# Patient Record
Sex: MALE | Race: WHITE | NOT HISPANIC OR LATINO | Employment: FULL TIME | URBAN - METROPOLITAN AREA
[De-identification: names, ages, dates, MRNs, and addresses within clinical notes are randomized per-mention and may not be internally consistent; named-entity substitution may affect disease eponyms.]

---

## 2020-02-10 ENCOUNTER — TRANSFERRED RECORDS (OUTPATIENT)
Dept: HEALTH INFORMATION MANAGEMENT | Facility: CLINIC | Age: 40
End: 2020-02-10

## 2022-05-10 ENCOUNTER — OFFICE VISIT (OUTPATIENT)
Dept: FAMILY MEDICINE | Facility: CLINIC | Age: 42
End: 2022-05-10
Payer: COMMERCIAL

## 2022-05-10 VITALS
HEIGHT: 68 IN | HEART RATE: 76 BPM | WEIGHT: 185.7 LBS | DIASTOLIC BLOOD PRESSURE: 88 MMHG | SYSTOLIC BLOOD PRESSURE: 144 MMHG | BODY MASS INDEX: 28.14 KG/M2 | RESPIRATION RATE: 11 BRPM | TEMPERATURE: 98.2 F | OXYGEN SATURATION: 98 %

## 2022-05-10 DIAGNOSIS — M10.9 ACUTE GOUTY ARTHRITIS: Primary | ICD-10-CM

## 2022-05-10 DIAGNOSIS — R03.0 ELEVATED BP WITHOUT DIAGNOSIS OF HYPERTENSION: ICD-10-CM

## 2022-05-10 DIAGNOSIS — M1A.9XX0 CHRONIC GOUT INVOLVING TOE OF RIGHT FOOT WITHOUT TOPHUS, UNSPECIFIED CAUSE: ICD-10-CM

## 2022-05-10 DIAGNOSIS — Z23 NEED FOR TDAP VACCINATION: ICD-10-CM

## 2022-05-10 LAB — URATE SERPL-MCNC: 7.3 MG/DL (ref 3.5–7.2)

## 2022-05-10 PROCEDURE — 36415 COLL VENOUS BLD VENIPUNCTURE: CPT | Performed by: FAMILY MEDICINE

## 2022-05-10 PROCEDURE — 99203 OFFICE O/P NEW LOW 30 MIN: CPT | Mod: 25 | Performed by: FAMILY MEDICINE

## 2022-05-10 PROCEDURE — 90715 TDAP VACCINE 7 YRS/> IM: CPT | Performed by: FAMILY MEDICINE

## 2022-05-10 PROCEDURE — 84550 ASSAY OF BLOOD/URIC ACID: CPT | Performed by: FAMILY MEDICINE

## 2022-05-10 PROCEDURE — 90471 IMMUNIZATION ADMIN: CPT | Performed by: FAMILY MEDICINE

## 2022-05-10 RX ORDER — ALLOPURINOL 100 MG/1
100 TABLET ORAL DAILY
Qty: 90 TABLET | Refills: 3 | Status: SHIPPED | OUTPATIENT
Start: 2022-05-10

## 2022-05-10 RX ORDER — INDOMETHACIN 25 MG/1
25 CAPSULE ORAL
Qty: 30 CAPSULE | Refills: 0 | Status: SHIPPED | OUTPATIENT
Start: 2022-05-10 | End: 2022-06-03

## 2022-05-10 ASSESSMENT — PAIN SCALES - GENERAL: PAINLEVEL: SEVERE PAIN (7)

## 2022-05-10 NOTE — PROGRESS NOTES
"  Assessment & Plan     Acute gouty arthritis  Results for orders placed or performed in visit on 05/10/22   Uric acid     Status: Abnormal   Result Value Ref Range    Uric Acid 7.3 (H) 3.5 - 7.2 mg/dL       Recommended to keep the legs elevated while resting, apply local ice and heat, start on Indocin 25 mg 3 times a day for the next 3 days followed by 3 times a day as needed for gout pain  Avoid potential triggers for gout, education handouts attached with after visit summary  Once the acute inflammation subsides, patient will consider starting on allopurinol 100 mg once daily for chronic prevention  Dosing and potential medication side effects discussed.  Patient verbalised understanding and is agreeable to the plan.  Reviewed elevated uric acid, prescription given for allopurinol 100 mg daily to start for chronic prevention, 2 weeks after complete resolution of the current acute episode  - Uric acid; Future  - indomethacin (INDOCIN) 25 MG capsule; Take 1 capsule (25 mg) by mouth 3 times daily (with meals)    Need for Tdap vaccination    - TDAP VACCINE (Adacel, Boostrix)    Elevated BP without diagnosis of hypertension  BP Readings from Last 6 Encounters:   05/10/22 (!) 144/88   09/09/15 (!) 136/95   07/29/15 146/81     Initial blood pressure was 179/109, rechecked-166/120, 144/88  Has no previous history of hypertension  Today's elevated blood pressure could be from his current pain  Recommended to have a follow-up at the time of physical in 2 to 3 weeks      Review of the result(s) of each unique test - Serum uric acid         BMI:   Estimated body mass index is 28.44 kg/m  as calculated from the following:    Height as of this encounter: 1.721 m (5' 7.75\").    Weight as of this encounter: 84.2 kg (185 lb 11.2 oz).       Chart documentation done in part with Dragon Voice recognition Software. Although reviewed after completion, some word and grammatical error may remain.    See Patient Instructions    Return in " about 2 weeks (around 5/24/2022), or if symptoms worsen or fail to improve, for Physical Exam, fasting labs.    Saqib Velasco MD  [facility] NATHALIE Sneed is a 41 year old who presents for the following health issues   Patient is new to the provider, is here today with concerns of having moderate to severe pain below the right big toe and right medial arch for the past 3 days, associated with swelling and redness of skin over the affected area  Denies history of injury, fall  Has history of recurrent gout in the past, last episode was 2 years ago  Patient works in [employer] and is very active   He is not aware of any particular triggers for his gout  Denies use of alcohol  Quit smoking more than 2 years ago   other HPI as mentioned below  His blood pressure was significantly elevated today at the time of rooming  Denies previous history of hypertension, thyroid disorder, sleep problems  Has family history of hypertension and heart disease in father        History of Present Illness       Reason for visit:  Gout flare up  Symptom onset:  3-7 days ago  Symptoms include:  Swelling in right foot, arch and big toe  Symptom intensity:  Severe  Symptom progression:  Staying the same  Had these symptoms before:  Yes  Has tried/received treatment for these symptoms:  Yes  Previous treatment was successful:  Yes  Prior treatment description:  Indomethacin and prednocin  What makes it worse:  Walking  What makes it better:  Elevated    He eats 0-1 servings of fruits and vegetables daily.He consumes 2 sweetened beverage(s) daily.He exercises with enough effort to increase his heart rate 60 or more minutes per day.  He exercises with enough effort to increase his heart rate 6 days per week.   He is taking medications regularly.             Review of Systems   CONSTITUTIONAL: NEGATIVE for fever, chills, change in weight  INTEGUMENTARY/SKIN: NEGATIVE for worrisome rashes,  "moles or lesions  RESP: NEGATIVE for significant cough or SOB  CV: NEGATIVE for chest pain, palpitations or peripheral edema  CV: Elevated blood pressure  MUSCULOSKELETAL: as above  NEURO: NEGATIVE for weakness, dizziness or paresthesias  ENDOCRINE: NEGATIVE for temperature intolerance, skin/hair changes  HEME/ALLERGY/IMMUNE: NEGATIVE for bleeding problems  PSYCHIATRIC: NEGATIVE for changes in mood or affect      Objective    BP (!) 144/88   Pulse 76   Temp 98.2  F (36.8  C) (Oral)   Resp 11   Ht 1.721 m (5' 7.75\")   Wt 84.2 kg (185 lb 11.2 oz)   SpO2 98%   BMI 28.44 kg/m    Body mass index is 28.44 kg/m .  Physical Exam   GENERAL: healthy, alert and no distress  MS: Antalgic gait  Right foot-minimal to moderate soft tissue swelling, redness of skin slight warmth and moderate tenderness over the, right first MTP joint  SKIN: as above  PSYCH: mentation appears normal, affect normal/bright    Results for orders placed or performed in visit on 05/10/22   Uric acid     Status: Abnormal   Result Value Ref Range    Uric Acid 7.3 (H) 3.5 - 7.2 mg/dL                 "

## 2022-05-10 NOTE — NURSING NOTE
Prior to immunization administration, verified patients identity using patient s name and date of birth. Please see Immunization Activity for additional information.     Screening Questionnaire for Adult Immunization    Are you sick today?   No   Do you have allergies to medications, food, a vaccine component or latex?   No   Have you ever had a serious reaction after receiving a vaccination?   No   Do you have a long-term health problem with heart, lung, kidney, or metabolic disease (e.g., diabetes), asthma, a blood disorder, no spleen, complement component deficiency, a cochlear implant, or a spinal fluid leak?  Are you on long-term aspirin therapy?   No   Do you have cancer, leukemia, HIV/AIDS, or any other immune system problem?   No   Do you have a parent, brother, or sister with an immune system problem?   No   In the past 3 months, have you taken medications that affect  your immune system, such as prednisone, other steroids, or anticancer drugs; drugs for the treatment of rheumatoid arthritis, Crohn s disease, or psoriasis; or have you had radiation treatments?   No   Have you had a seizure, or a brain or other nervous system problem?   No   During the past year, have you received a transfusion of blood or blood    products, or been given immune (gamma) globulin or antiviral drug?   No   For women: Are you pregnant or is there a chance you could become       pregnant during the next month?   No   Have you received any vaccinations in the past 4 weeks?   No     Immunization questionnaire answers were all negative.        Per orders of Dr. Velasco, injection of TDAP given by Renetta Eastman. Patient instructed to remain in clinic for 15 minutes afterwards, and to report any adverse reaction to me immediately.       Screening performed by Renetta Eastman on 5/10/2022 at 9:45 AM.

## 2022-05-10 NOTE — RESULT ENCOUNTER NOTE
Antelmo Argueta,  Your uric acid is slightly elevated at 7.3  Once your pain and swelling is completely resolved, you can start taking allopurinol 100 mg once daily for prevention.  I have sent a prescription to your pharmacy.   Let me know if you have any questions. Take care.  Saqib Velasco MD

## 2022-05-15 ENCOUNTER — HEALTH MAINTENANCE LETTER (OUTPATIENT)
Age: 42
End: 2022-05-15

## 2022-06-01 ENCOUNTER — LAB (OUTPATIENT)
Dept: LAB | Facility: CLINIC | Age: 42
End: 2022-06-01
Payer: COMMERCIAL

## 2022-06-01 DIAGNOSIS — Z13.1 SCREENING FOR DIABETES MELLITUS (DM): ICD-10-CM

## 2022-06-01 DIAGNOSIS — Z11.4 SCREENING FOR HIV (HUMAN IMMUNODEFICIENCY VIRUS): ICD-10-CM

## 2022-06-01 DIAGNOSIS — Z11.59 NEED FOR HEPATITIS C SCREENING TEST: ICD-10-CM

## 2022-06-01 DIAGNOSIS — Z13.220 SCREENING FOR HYPERLIPIDEMIA: ICD-10-CM

## 2022-06-01 DIAGNOSIS — Z13.0 SCREENING FOR DEFICIENCY ANEMIA: ICD-10-CM

## 2022-06-01 LAB
ALBUMIN SERPL-MCNC: 3.8 G/DL (ref 3.4–5)
ALP SERPL-CCNC: 77 U/L (ref 40–150)
ALT SERPL W P-5'-P-CCNC: 27 U/L (ref 0–70)
ANION GAP SERPL CALCULATED.3IONS-SCNC: 7 MMOL/L (ref 3–14)
AST SERPL W P-5'-P-CCNC: 17 U/L (ref 0–45)
BILIRUB SERPL-MCNC: 0.7 MG/DL (ref 0.2–1.3)
BUN SERPL-MCNC: 11 MG/DL (ref 7–30)
CALCIUM SERPL-MCNC: 9 MG/DL (ref 8.5–10.1)
CHLORIDE BLD-SCNC: 104 MMOL/L (ref 94–109)
CHOLEST SERPL-MCNC: 165 MG/DL
CO2 SERPL-SCNC: 27 MMOL/L (ref 20–32)
CREAT SERPL-MCNC: 1.06 MG/DL (ref 0.66–1.25)
ERYTHROCYTE [DISTWIDTH] IN BLOOD BY AUTOMATED COUNT: 11.6 % (ref 10–15)
FASTING STATUS PATIENT QL REPORTED: YES
GFR SERPL CREATININE-BSD FRML MDRD: 89 ML/MIN/1.73M2
GLUCOSE BLD-MCNC: 86 MG/DL (ref 70–99)
HCT VFR BLD AUTO: 41 % (ref 40–53)
HDLC SERPL-MCNC: 45 MG/DL
HGB BLD-MCNC: 14.5 G/DL (ref 13.3–17.7)
LDLC SERPL CALC-MCNC: 96 MG/DL
MCH RBC QN AUTO: 29.7 PG (ref 26.5–33)
MCHC RBC AUTO-ENTMCNC: 35.4 G/DL (ref 31.5–36.5)
MCV RBC AUTO: 84 FL (ref 78–100)
NONHDLC SERPL-MCNC: 120 MG/DL
PLATELET # BLD AUTO: 322 10E3/UL (ref 150–450)
POTASSIUM BLD-SCNC: 3.8 MMOL/L (ref 3.4–5.3)
PROT SERPL-MCNC: 7.2 G/DL (ref 6.8–8.8)
RBC # BLD AUTO: 4.89 10E6/UL (ref 4.4–5.9)
SODIUM SERPL-SCNC: 138 MMOL/L (ref 133–144)
TRIGL SERPL-MCNC: 122 MG/DL
WBC # BLD AUTO: 7.5 10E3/UL (ref 4–11)

## 2022-06-01 PROCEDURE — 87389 HIV-1 AG W/HIV-1&-2 AB AG IA: CPT

## 2022-06-01 PROCEDURE — 86803 HEPATITIS C AB TEST: CPT

## 2022-06-01 PROCEDURE — 85027 COMPLETE CBC AUTOMATED: CPT

## 2022-06-01 PROCEDURE — 36415 COLL VENOUS BLD VENIPUNCTURE: CPT

## 2022-06-01 PROCEDURE — 80053 COMPREHEN METABOLIC PANEL: CPT

## 2022-06-01 PROCEDURE — 80061 LIPID PANEL: CPT

## 2022-06-02 LAB
HCV AB SERPL QL IA: NONREACTIVE
HIV 1+2 AB+HIV1 P24 AG SERPL QL IA: NONREACTIVE

## 2022-06-03 ENCOUNTER — OFFICE VISIT (OUTPATIENT)
Dept: FAMILY MEDICINE | Facility: CLINIC | Age: 42
End: 2022-06-03
Payer: COMMERCIAL

## 2022-06-03 VITALS
RESPIRATION RATE: 18 BRPM | HEART RATE: 71 BPM | DIASTOLIC BLOOD PRESSURE: 100 MMHG | TEMPERATURE: 98.3 F | OXYGEN SATURATION: 99 % | WEIGHT: 180.4 LBS | SYSTOLIC BLOOD PRESSURE: 153 MMHG | HEIGHT: 67 IN | BODY MASS INDEX: 28.31 KG/M2

## 2022-06-03 DIAGNOSIS — Z00.00 ROUTINE GENERAL MEDICAL EXAMINATION AT A HEALTH CARE FACILITY: Primary | ICD-10-CM

## 2022-06-03 DIAGNOSIS — M10.9 ACUTE GOUTY ARTHRITIS: ICD-10-CM

## 2022-06-03 DIAGNOSIS — M1A.9XX0 CHRONIC GOUT INVOLVING TOE OF RIGHT FOOT WITHOUT TOPHUS, UNSPECIFIED CAUSE: ICD-10-CM

## 2022-06-03 DIAGNOSIS — R03.0 ELEVATED BP WITHOUT DIAGNOSIS OF HYPERTENSION: ICD-10-CM

## 2022-06-03 DIAGNOSIS — Z13.29 SCREENING FOR THYROID DISORDER: ICD-10-CM

## 2022-06-03 LAB
CREAT UR-MCNC: 185 MG/DL
MICROALBUMIN UR-MCNC: 6 MG/L
MICROALBUMIN/CREAT UR: 3.24 MG/G CR (ref 0–17)
TSH SERPL DL<=0.005 MIU/L-ACNC: 1.66 MU/L (ref 0.4–4)

## 2022-06-03 PROCEDURE — 84443 ASSAY THYROID STIM HORMONE: CPT | Performed by: FAMILY MEDICINE

## 2022-06-03 PROCEDURE — 82043 UR ALBUMIN QUANTITATIVE: CPT | Performed by: FAMILY MEDICINE

## 2022-06-03 PROCEDURE — 99214 OFFICE O/P EST MOD 30 MIN: CPT | Mod: 25 | Performed by: FAMILY MEDICINE

## 2022-06-03 PROCEDURE — 99396 PREV VISIT EST AGE 40-64: CPT | Performed by: FAMILY MEDICINE

## 2022-06-03 PROCEDURE — 36415 COLL VENOUS BLD VENIPUNCTURE: CPT | Performed by: FAMILY MEDICINE

## 2022-06-03 RX ORDER — PREDNISONE 20 MG/1
40 TABLET ORAL DAILY
Qty: 10 TABLET | Refills: 0 | Status: SHIPPED | OUTPATIENT
Start: 2022-06-03

## 2022-06-03 ASSESSMENT — PAIN SCALES - GENERAL: PAINLEVEL: NO PAIN (0)

## 2022-06-03 NOTE — PROGRESS NOTES
SUBJECTIVE:   CC: Naren Gonzalez is an 41 year old male who presents for preventative health visit.       Patient has been advised of split billing requirements and indicates understanding: Yes  Healthy Habits:    Getting at least 3 servings of Calcium per day:  Yes    Bi-annual eye exam:  Yes    Dental care twice a year:  Yes    Sleep apnea or symptoms of sleep apnea:  None    Diet:  Regular (no restrictions)    Frequency of exercise:  2-3 days/week    Duration of exercise:  15-30 minutes    Taking medications regularly:  Yes    Barriers to taking medications:  None    Medication side effects:  None    PHQ-2 Total Score:    Additional concerns today:  No                Today's PHQ-2 Score:   PHQ-2 (  [employer]) 6/3/2022   Q1: Little interest or pleasure in doing things -   Q2: Feeling down, depressed or hopeless -   PHQ-2 Score -   Q1: Little interest or pleasure in doing things -   Q2: Feeling down, depressed or hopeless -   PHQ-2 Score Incomplete       Abuse: Current or Past(Physical, Sexual or Emotional)- No  Do you feel safe in your environment? Yes    Have you ever done Advance Care Planning? (For example, a Health Directive, POLST, or a discussion with a medical provider or your loved ones about your wishes): No, advance care planning information given to patient to review.  Patient plans to discuss their wishes with loved ones or provider.      Social History     Tobacco Use     Smoking status: Former Smoker     Packs/day: 1.00     Years: 23.00     Pack years: 23.00     Types: Cigarettes     Start date: 1998     Quit date: 3/3/2020     Years since quittin.2     Smokeless tobacco: Never Used     Tobacco comment: Just finally decided to quit due to leg and ankle surgery   Substance Use Topics     Alcohol use: Not Currently     If you drink alcohol do you typically have >3 drinks per day or >7 drinks per week? No    No flowsheet data found.    Last PSA: No results found for: PSA    Reviewed orders  with patient. Reviewed health maintenance and updated orders accordingly - Yes  Lab work is in process  Labs reviewed in EPIC  BP Readings from Last 3 Encounters:   22 (!) 153/100   05/10/22 (!) 144/88   09/09/15 (!) 136/95    Wt Readings from Last 3 Encounters:   22 81.8 kg (180 lb 6.4 oz)   05/10/22 84.2 kg (185 lb 11.2 oz)                  Patient Active Problem List   Diagnosis     Encounter for vasectomy     Chronic gout involving toe of right foot without tophus, unspecified cause     Elevated BP without diagnosis of hypertension     Past Surgical History:   Procedure Laterality Date     ORTHOPEDIC SURGERY  2020    Broke Left Ankle, Heal and Calcanus       Social History     Tobacco Use     Smoking status: Former Smoker     Packs/day: 1.00     Years: 23.00     Pack years: 23.00     Types: Cigarettes     Start date: 1998     Quit date: 3/3/2020     Years since quittin.2     Smokeless tobacco: Never Used     Tobacco comment: Just finally decided to quit due to leg and ankle surgery   Substance Use Topics     Alcohol use: Not Currently     Family History   Problem Relation Age of Onset     Coronary Artery Disease Father      Hypertension Father      Diabetes Father         Type 2     Substance Abuse Brother         Sober 21 years     Diabetes Nephew         Type 1         Current Outpatient Medications   Medication Sig Dispense Refill     allopurinol (ZYLOPRIM) 100 MG tablet Take 1 tablet (100 mg) by mouth daily Start after complete resolution of your current acute gouty arthritis 90 tablet 3     Blood Pressure Monitor KIT Use to check BP daily 1 kit 0     predniSONE (DELTASONE) 20 MG tablet Take 2 tablets (40 mg) by mouth daily 10 tablet 0     No Known Allergies  Recent Labs   Lab Test 22  1553   LDL 96   HDL 45   TRIG 122   ALT 27   CR 1.06   GFRESTIMATED 89   POTASSIUM 3.8        Reviewed and updated as needed this visit by clinical staff   Tobacco  Allergies  Meds   Med Hx   "Surg Hx  Fam Hx  Soc Hx          Reviewed and updated as needed this visit by Provider                     Past Medical History:   Diagnosis Date     Chronic gout 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       Review of Systems  CONSTITUTIONAL: NEGATIVE for fever, chills, change in weight  INTEGUMENTARY/SKIN: NEGATIVE for worrisome rashes, moles or lesions  EYES: NEGATIVE for vision changes or irritation  ENT: NEGATIVE for ear, mouth and throat problems  RESP: NEGATIVE for significant cough or SOB  CV: NEGATIVE for chest pain, palpitations or peripheral edema  CV: Elevated blood pressures  GI: NEGATIVE for nausea, abdominal pain, heartburn, or change in bowel habits   male: negative for dysuria, hematuria, decreased urinary stream, erectile dysfunction, urethral discharge  MUSCULOSKELETAL: NEGATIVE for significant arthralgias or myalgia  MUSCULOSKELETAL: History of chronic gout  NEURO: NEGATIVE for weakness, dizziness or paresthesias  ENDOCRINE: NEGATIVE for temperature intolerance, skin/hair changes  HEME/ALLERGY/IMMUNE: NEGATIVE for bleeding problems  PSYCHIATRIC: NEGATIVE for changes in mood or affect    OBJECTIVE:   BP (!) 153/100 (BP Location: Right arm, Patient Position: Sitting, Cuff Size: Adult Large)   Pulse 71   Temp 98.3  F (36.8  C) (Oral)   Resp 18   Ht 1.689 m (5' 6.5\")   Wt 81.8 kg (180 lb 6.4 oz)   SpO2 99%   BMI 28.68 kg/m      Physical Exam  GENERAL: healthy, alert and no distress  EYES: Eyes grossly normal to inspection, PERRL and conjunctivae and sclerae normal  HENT: ear canals and TM's normal, nose and mouth without ulcers or lesions  NECK: no adenopathy, no asymmetry, masses, or scars and thyroid normal to palpation  RESP: lungs clear to auscultation - no rales, rhonchi or " wheezes  CV: regular rate and rhythm, normal S1 S2, no S3 or S4, no murmur, click or rub, no peripheral edema and peripheral pulses strong  ABDOMEN: soft, nontender, no hepatosplenomegaly, no masses and bowel sounds normal  MS: no gross musculoskeletal defects noted, no edema  SKIN: no suspicious lesions or rashes  NEURO: Normal strength and tone, mentation intact and speech normal  PSYCH: mentation appears normal, affect normal/bright    Diagnostic Test Results:  Labs reviewed in Epic    ASSESSMENT/PLAN:   (Z00.00) Routine general medical examination at a health care facility  (primary encounter diagnosis)  Comment:   Plan: : Discussed on regular exercises, healthy eating,  and routine dental checks.    (M1A.9XX0) Chronic gout involving toe of right foot without tophus, unspecified cause  Comment:   Plan: Continue with current dose of allopurinol 100 mg daily    (R03.0) Elevated BP without diagnosis of hypertension  Comment:   Plan: TSH with free T4 reflex, Blood Pressure Monitor        KIT, 24 Hour Blood Pressure Monitor - Adult,         Albumin Random Urine Quantitative with Creat         Ratio          BP Readings from Last 6 Encounters:   06/03/22 (!) 153/100   05/10/22 (!) 144/88   09/09/15 (!) 136/95   07/29/15 146/81     Reviewed blood pressure readings x2 at the time of rooming both were elevated  Reviewed previous outside records and elevated blood pressure readings at the time of urology appointment back in 2015  Patient is not convinced and thinks this could be related to either acute pain at the time of visits for the gout and bone fracture or from stress and anxiety  Has family history of hypertension in father  Recommended patient to have 24-hour blood pressure monitoring for further evaluation  He will also start taking blood pressures at home once a day and send blood pressure readings through [employer] to provider's review in 2 weeks  We will check a urine microalbumin today to look for  "proteinuria  Will f/u on results and call with recommendations.  Patient verbalised understanding and is agreeable to the plan.        (Z13.29) Screening for thyroid disorder  Comment:   Plan: TSH with free T4 reflex            (M10.9) Acute gouty arthritis  Comment:   Plan: predniSONE (DELTASONE) 20 MG tablet        Patient requested prescription for prednisone to use for future episodic flareup of acute gout since indomethacin helps with pain relief but not with relief of swelling from gout  1 refill given, if this does not take care of the symptoms, patient understands to follow-up for further evaluation          COUNSELING:   Reviewed preventive health counseling, as reflected in patient instructions  Special attention given to:        Regular exercise       Healthy diet/nutrition       Vision screening       Immunizations    Declined: COVID chart               Consider Hep C screening for all patients one time for ages 18-79 years       HIV screeninx in teen years, 1x in adult years, and at intervals if high risk       The 10-year ASCVD risk score (Lucian GREENBERG Jr., et al., 2013) is: 1.8%    Values used to calculate the score:      Age: 41 years      Sex: Male      Is Non- : No      Diabetic: No      Tobacco smoker: No      Systolic Blood Pressure: 153 mmHg      Is BP treated: No      HDL Cholesterol: 45 mg/dL      Total Cholesterol: 165 mg/dL    Estimated body mass index is 28.68 kg/m  as calculated from the following:    Height as of this encounter: 1.689 m (5' 6.5\").    Weight as of this encounter: 81.8 kg (180 lb 6.4 oz).     Weight management plan: Discussed healthy diet and exercise guidelines    He reports that he quit smoking about 2 years ago. His smoking use included cigarettes. He started smoking about 24 years ago. He has a 23.00 pack-year smoking history. He has never used smokeless tobacco.      Counseling Resources:  ATP IV Guidelines  Pooled Cohorts Equation " Calculator  FRAX Risk Assessment  ICSI Preventive Guidelines  Dietary Guidelines for Americans, 2010  USDA's MyPlate  ASA Prophylaxis  Lung CA Screening    Saqib Velasco MD  [facility]  Chart documentation done in part with Dragon Voice recognition Software. Although reviewed after completion, some word and grammatical error may remain.

## 2022-06-06 NOTE — RESULT ENCOUNTER NOTE
Antelmo Argueta,  Your labs showed normal thyroid tests and urine with no protein leak, this is good.  Let me know if you have any questions. Take care.  Saqib Velasco MD

## 2022-06-06 NOTE — RESULT ENCOUNTER NOTE
Antelmo Argueta,  Your labs showed normal results for hemoglobin with no anemia, fasting blood sugar with no diabetes, excellent fasting cholesterol numbers.  Your HIV and hepatitis C screening tests are negative, this is reassuring.  Liver and kidney functions are normal.  Please continue with your current efforts on healthy eating and regular exercises.   Let me know if you have any questions. Take care.  Saqib Velasco MD

## 2022-09-11 ENCOUNTER — HEALTH MAINTENANCE LETTER (OUTPATIENT)
Age: 42
End: 2022-09-11

## 2023-05-04 ENCOUNTER — PATIENT OUTREACH (OUTPATIENT)
Dept: CARE COORDINATION | Facility: CLINIC | Age: 43
End: 2023-05-04
Payer: COMMERCIAL

## 2023-05-18 ENCOUNTER — PATIENT OUTREACH (OUTPATIENT)
Dept: CARE COORDINATION | Facility: CLINIC | Age: 43
End: 2023-05-18
Payer: COMMERCIAL

## 2023-07-29 ENCOUNTER — HEALTH MAINTENANCE LETTER (OUTPATIENT)
Age: 43
End: 2023-07-29

## 2024-09-15 ENCOUNTER — HEALTH MAINTENANCE LETTER (OUTPATIENT)
Age: 44
End: 2024-09-15